# Patient Record
Sex: MALE | Race: ASIAN | NOT HISPANIC OR LATINO | Employment: STUDENT | URBAN - METROPOLITAN AREA
[De-identification: names, ages, dates, MRNs, and addresses within clinical notes are randomized per-mention and may not be internally consistent; named-entity substitution may affect disease eponyms.]

---
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
## 2024-01-17 ENCOUNTER — TELEPHONE (OUTPATIENT)
Dept: PSYCHIATRY | Facility: CLINIC | Age: 19
End: 2024-01-17
Payer: COMMERCIAL

## 2024-01-17 NOTE — TELEPHONE ENCOUNTER
Strengths Program Outreach  A Part of the [facility] First Episode of Psychosis Program    Yaakov Nova has been removed from our waiting list.    Left voicemail message: Yes, 12/19/23, 12/27/23, 1/3/24, 1/8/24, 1/11/24, 1/17/24 - no answer or call back.  Sent [employer] message: N/A Patient did not enroll in mychart services.    Yaakov can call the main clinic number at [phone] with request to schedule an updated diagnostic assessment in RAMP to be reconsidered for early psychosis services.     Sarah Wright